# Patient Record
(demographics unavailable — no encounter records)

---

## 2025-04-16 NOTE — HISTORY OF PRESENT ILLNESS
[FreeTextEntry1] : 30M presenting with concern for presence of external tissue that he's noticed for about a year Also c/o BRB and pain with some BMs, last time saw he saw blood was 2 weeks ago, but reporting tear like pain with most BMs. Pain starts after BM and can linger through out the day. Patient moves bowels one to multiple times a day and stool consistency varies from loose to harder or larger pieces. Has not been using anything topical for this complaint. States he saw he another provider a number of months back who just told him to eat more fiber, but patient found that eating more fiber caused larger stools which made it feel like tear was opening up again.    Referred via [facility]  PMH: Anxiety Meds: Setraline All: NKDA PSH: Denies FH: Denies CRC/IBD Cscope: Never done

## 2025-04-16 NOTE — PHYSICAL EXAM
[Abdomen Tenderness] : ~T No ~M abdominal tenderness [JVD] : no jugular venous distention  [Normal Breath Sounds] : Normal breath sounds [No Rash or Lesion] : No rash or lesion [Alert] : alert [Calm] : calm [de-identified] : Well appearing male in NAD [de-identified] : MMM [de-identified] : ROM WNL [FreeTextEntry1] : The pt was examined in the prone crissy-knife position with a medical assistant present for the entirety of the examination. Visual examination of the anal verge with effacement of the buttocks revealed a chronic posterior midline anal fissure with associated large soft sentinel pile with a pit just distal. No signs of active infection. Otherwise no masses, ulcerations, or skin rashes. Digital rectal exam and anoscopy deferred.  The patient tolerated the exam well.